# Patient Record
(demographics unavailable — no encounter records)

---

## 2025-03-25 NOTE — PAST MEDICAL HISTORY
[At ___ Weeks Gestation] : at [unfilled] weeks gestation [ Section] : by  section [] : There were no problems passing meconium within 24 - 48 hrs of life [FreeTextEntry1] : 8lbs

## 2025-03-25 NOTE — CONSULT LETTER
[Dear  ___] : Dear  [unfilled], [Consult Letter:] : I had the pleasure of evaluating your patient, [unfilled]. [Please see my note below.] : Please see my note below. [Consult Closing:] : Thank you very much for allowing me to participate in the care of this patient.  If you have any questions, please do not hesitate to contact me. [Sincerely,] : Sincerely, [FreeTextEntry3] : Keara Gibbs MD [facility] physician partners Pediatric gastroenterologist , [facility] of medicine at [facility] [phone] [email or web address]

## 2025-03-25 NOTE — HISTORY OF PRESENT ILLNESS
[de-identified] : Acid reflux  referred by  DERRICK KENYON , is  a 2 month old male here for  Malodorous , Difficulty burping and passing gas, and spitting up phlegm mstly on formula sice mom can't produce breast milk.  Mom is dairy free. drinks alimentum.  was on other formulas in the past.   with other formulas had trouble getting out gas. had liquidy stools. mom is dairy free.    breast feeds 1-2 times a night.   drinks 4-5 ounces  every 3-4 hours.    sleeps 10 hrs a night.   Mom states that he has malodorous gas.  They tried bio Dana for 1 week with no result.  Restarted by the PCP on 312 with increased dosing.  Parents think that the overall malodorous gas has improved although continues.   Also noted to have trouble burping and passing gas.  Seems to only pass gas with usage of simethicone. Does seem to be uncomfortable with passing bowel movements at times as well eats comfortably but does have some stridor at times.    Weight gain is appropriate 25 g/day Stools are described as 2/day. Consistency is jellylike and liquidy.  no blood or mucus noted. previously had liquidy stools while on different formulas  No choking or cyanosis with feeds.   Denies easy bleeding or bruising, jaundice, hematochezia, melena, recurrent fevers or infection, mouth sores, joint swelling, vision changes, unintentional weight loss.

## 2025-03-25 NOTE — SOCIAL HISTORY
[Mother] : mother [Father] : father [Parent(s)] : parent(s) [Sister] : sister [de-identified] : sagar

## 2025-03-25 NOTE — SOCIAL HISTORY
[Mother] : mother [Father] : father [Parent(s)] : parent(s) [Sister] : sister [de-identified] : sagar

## 2025-03-25 NOTE — ASSESSMENT
[Educated Patient & Family about Diagnosis] : educated the patient and family about the diagnosis [FreeTextEntry1] : DENEEN  is a 2 month  OLD male  here for consultation of  liquidy stools, foul-smelling gas and difficulty burping and passing flatus.  Currently on Alimentum.  Weight gain has been appropriate. Differential diagnosis includes gassiness secondary to mom's dietary intake as she is breast-feeding versus milk protein intolerance          Recommendations continued bio Dana probiotics Transition to amino acid based formula Can use simethicone and windy as needed FUV in 3 weeks

## 2025-03-25 NOTE — CONSULT LETTER
[Dear  ___] : Dear  [unfilled], [Consult Letter:] : I had the pleasure of evaluating your patient, [unfilled]. [Please see my note below.] : Please see my note below. [Consult Closing:] : Thank you very much for allowing me to participate in the care of this patient.  If you have any questions, please do not hesitate to contact me. [Sincerely,] : Sincerely, [FreeTextEntry3] : Keara Gibbs MD [facility] physician partners Pediatric gastroenterologist , [facility] of medicine at [facility] [phone] [email or web address]

## 2025-03-25 NOTE — HISTORY OF PRESENT ILLNESS
[de-identified] : Acid reflux  referred by  DERRICK EKNYON , is  a 2 month old male here for  Malodorous , Difficulty burping and passing gas, and spitting up phlegm mstly on formula sice mom can't produce breast milk.  Mom is dairy free. drinks alimentum.  was on other formulas in the past.   with other formulas had trouble getting out gas. had liquidy stools. mom is dairy free.    breast feeds 1-2 times a night.   drinks 4-5 ounces  every 3-4 hours.    sleeps 10 hrs a night.   Mom states that he has malodorous gas.  They tried bio Dana for 1 week with no result.  Restarted by the PCP on 312 with increased dosing.  Parents think that the overall malodorous gas has improved although continues.   Also noted to have trouble burping and passing gas.  Seems to only pass gas with usage of simethicone. Does seem to be uncomfortable with passing bowel movements at times as well eats comfortably but does have some stridor at times.    Weight gain is appropriate 25 g/day Stools are described as 2/day. Consistency is jellylike and liquidy.  no blood or mucus noted. previously had liquidy stools while on different formulas  No choking or cyanosis with feeds.   Denies easy bleeding or bruising, jaundice, hematochezia, melena, recurrent fevers or infection, mouth sores, joint swelling, vision changes, unintentional weight loss.

## 2025-04-29 NOTE — HISTORY OF PRESENT ILLNESS
[de-identified] : Acid reflux  referred by  DERRICK KENYON , is  a3  month old male here for  Malodorous , Difficulty burping and passing gas, and spitting up phlegm  mom tried  neocate but he didt like it. now is alimentum  and myelicon once a day and probitiocs  mom also breast feeding btu has small amount of milk. drinks 20-24 ounces/day.   breast feeds  or gets formula every 4 hours. mostly on formula since mom can't produce breast milk.  Mom is eating dairy now. wt gain 19 gm/day  improvement in passing gas and burping.  Gas is not malodorous anymore. stools are at least daily.  soft with no blood or mucus noted.   Weight gain is appropriate 19 g/day No choking or cyanosis with feeds.   Denies easy bleeding or bruising, jaundice, hematochezia, melena, recurrent fevers or infection, mouth sores, joint swelling, vision changes, unintentional weight loss.

## 2025-04-29 NOTE — PHYSICAL EXAM
[Well Developed] : well developed [NAD] : in no acute distress [PERRL] : pupils were equal, round, reactive to light  [icteric] : anicteric [Moist & Pink Mucous Membranes] : moist and pink mucous membranes [CTAB] : lungs clear to auscultation bilaterally [Respiratory Distress] : no respiratory distress  [Regular Rate and Rhythm] : regular rate and rhythm [Normal S1, S2] : normal S1 and S2 [Soft] : soft  [Distended] : non distended [Tender] : non tender [Normal Bowel Sounds] : normal bowel sounds [No HSM] : no hepatosplenomegaly appreciated [Normal Position] : normal position [Normal External Genitalia] : normal external genitalia [Normal Tone] : normal tone [Well-Perfused] : well-perfused [Edema] : no edema [Cyanosis] : no cyanosis [Rash] : no rash [Jaundice] : no jaundice [Interactive] : interactive

## 2025-04-29 NOTE — REASON FOR VISIT
[Consultation Follow Up] : a consultation follow up  [Mother] : mother [FreeTextEntry2] : still on alimentum [Language Line ] : provided by [employer]   [Time Spent: ____ minutes] : Total time spent using  services: [unfilled] minutes. The patient's primary language is not English thus required  services. [Interpreters_FullName] : Pete [TWNoteComboBox1] : Citizen of [place]

## 2025-04-29 NOTE — CONSULT LETTER
[Dear  ___] : Dear  [unfilled], [Consult Letter:] : I had the pleasure of evaluating your patient, [unfilled]. [Please see my note below.] : Please see my note below. [Consult Closing:] : Thank you very much for allowing me to participate in the care of this patient.  If you have any questions, please do not hesitate to contact me. [Sincerely,] : Sincerely, [FreeTextEntry3] : Keara Gibbs MD [facility] physician partners Pediatric gastroenterologist , [facility] of medicine at [facility] [phone] [email or web address]

## 2025-04-29 NOTE — ASSESSMENT
[Educated Patient & Family about Diagnosis] : educated the patient and family about the diagnosis [FreeTextEntry1] : DENEEN  is a3 month  OLD male  here for consultation of  liquidy stools, foul-smelling gas and difficulty burping and passing flatus.  Currently on Alimentum.  Weight gain has been appropriate. Mom is not dairy free but she has limited breastmilk.  Overall improving with Alimentum, Mylicon and bio Dana probiotics          Recommendations continued bio Dana probiotics Use simethicone as needed  Continue Alimentum.  Would recommend trial of regular cow milk-based formula.  Would recommend half a bottle a day for 3 days then 1 bottle a day for 3 days.  If symptoms do not return would recommend transitioning back to cow milk-based formula  Follow-up in 2 months

## 2025-06-23 NOTE — CONSULT LETTER
[Dear  ___] : Dear  [unfilled], [Consult Letter:] : I had the pleasure of evaluating your patient, [unfilled]. [Please see my note below.] : Please see my note below. [Consult Closing:] : Thank you very much for allowing me to participate in the care of this patient.  If you have any questions, please do not hesitate to contact me. [Sincerely,] : Sincerely, [FreeTextEntry3] : Keara Gibbs MD [facility] physician partners Pediatric gastroenterologist , [facility] of medicine at [facility] [phone] [email or web address]

## 2025-06-23 NOTE — HISTORY OF PRESENT ILLNESS
[de-identified] : Acid reflux  referred by  DERRICK FRAZIER  DENEENJEZ KENYON , is  a 5-month-old with malodorous breath, difficulty burping and passing gas.  Has mild laryngomalacia.  Was originally on Alimentum.  Now switched to kendamil organic.  drinking kendamil organic 7 ounce/bottle. drinks 4 ounces. 25-30 oucnes. Eats purees twice a day.  Will eat pancakes with avocado banana, pureed fruits, cereal, yogurt. has egg allergy. has a rash.  wt gain is 33 gm/day.  Gained almost 4 pounds since his visit in April 2 soft stools/day   now vomiting or choking with feeds.   now on famotidine for laryngomalacia  0.5 ml nightly as per ENT For larygnomalacia  Denies easy bleeding or bruising, jaundice, hematochezia, melena, recurrent fevers or infection, mouth sores, joint swelling, vision changes, unintentional weight loss.

## 2025-06-23 NOTE — ASSESSMENT
[Educated Patient & Family about Diagnosis] : educated the patient and family about the diagnosis [FreeTextEntry1] : 5-month-old with malodorous breath and green gassiness now on regular kendamil organic formula.  Gained almost 4 pounds in the last 2 months.  Eating purees twice a day with kendamil organic  Would recommend decreasing the volume of formula to 6 ounces per feed  Continue purees and solid food introduction Continue famotidine as per ENT for laryngomalacia Continue allergen introduction except for eggs close he has an egg allergy for now  Follow-up in 4 months or as needed